# Patient Record
Sex: MALE | Race: WHITE | NOT HISPANIC OR LATINO | URBAN - METROPOLITAN AREA
[De-identification: names, ages, dates, MRNs, and addresses within clinical notes are randomized per-mention and may not be internally consistent; named-entity substitution may affect disease eponyms.]

---

## 2024-10-17 NOTE — PROGRESS NOTES
FAMILY MEDICINE  [facility]    Reason for visit:   Chief Complaint   Patient presents with    Establish Care    Chest Pain         SUBJECTIVE: Justin Olson is a 24 y.o. male  - with anxiety presents as a new patient to establish care and discuss chest  pain    Psychiatry Dr. Jignesh Gerard    1. Chest Pain    ]Justin presents with chest pain and left arm discomfort, seeking evaluation and reassurance regarding potential cardiac issues.    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 The pain has progressed to being constant throughout the day, initially noticed following alcohol consumption. The pain sometimes worsens throughout the day and after eating. He has been self-medicating with OTC omeprazole for the past 7 days to address these symptoms.Justin's father, a physician, suggested the symptoms might be related to anxiety or GERD rather than a cardiac issue, partly based on the absence of pain during the patient's daily running routine. Justin acknowledges increased stress due to a demanding academic semester.Justin reports a history of indigestion and nausea with the chest pain. He is currently taking Wellbutrin and Lexapro for anxiety and depression, managed by a psychiatrist in [place], Dr. Jignesh Gerard, with whom he consults approximately every 6 weeks. Dr. Gerard was considering prescribing propranolol for an upcoming speaking event but advised the patient to consult with a local physician first due to the cardiac-related symptoms.Justin denies worsening of chest pain during exercise.    He reports initially when pain started it was intermittent.  It was typically at rest.  He reports recently it has been constant throughout the day.  It feels like a pressure.  It seems to improve with exercise.  He denies any recent viral illnesses.  He was not had any recent vaccinations    Justin is on Omeprazole, an OTC medication,  daily for 7 days to treat indigestion and heartburn. He is also on Wellbutrin and Lexapro for anxiety and depression.  Justin has a history of anxiety and depression.    His father has high blood pressure, and there is a history of heart disease on the paternal side.    Justin reports recent alcohol consumption. He works as a law student at [employer].          Review of Systems   All other systems reviewed and are negative.      HEALTH MAINTENANCE:   Health Maintenance   Topic Date Due    Hepatitis C Screening  Never done    Lipid Panel  Never done    TETANUS VACCINE  04/12/2032    HPV Vaccines  Completed     Health Maintenance Topics with due status: Not Due       Topic Last Completion Date    TETANUS VACCINE 04/12/2022    RSV Vaccine (Age 60+ and Pregnant patients) Not Due     Health Maintenance Due   Topic Date Due    Hepatitis C Screening  Never done    Lipid Panel  Never done    HIV Screening  Never done    Influenza Vaccine (1) 09/01/2024    COVID-19 Vaccine (5 - 2024-25 season) 09/01/2024       HISTORY:   Past Medical History:   Diagnosis Date    Achilles tendinitis 04/13/2015    Acute bronchitis 03/20/2006    Qualifier: MDxof; Description: BRONCHITIS, ACUTE      Anxiety     Atopic conjunctivitis 01/10/2011    Description: ALLERGIC CONJUNCTIVITIS      Blepharitis 01/10/2011    Description: BLEPHARITIS NOS; Stop Reason: Resolved      Chondromalacia of left patella 09/08/2014    Description: CHONDROMALACIA PATELLAE, LEFT KNEE      Closed fracture of metatarsal bone 06/28/2010    Description: FRACTURE, METATARSUS      Closed fracture of phalanx of foot 11/18/2012    Description: FX CLOSED PHALANX, FOOT      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    Description: PES PLANUS, CONGENITAL      Depression     Eustachian tube dysfunction 01/17/2017    Description: EUSTACHIAN TUBE DYSFUNCTION, BILATERAL      Iritis 08/30/2012    Stop Reason: Resolved      Lumbar radiculopathy 10/03/2021    Stop Date: 10/03/2022       "Myopia 10/25/2012    Stop Reason: Resolved      Otitis media 11/13/2006    Verruca plantaris 07/01/2014    Description: PLANTAR WART      Vertigo 03/01/2016    Description: VERTIGO         Past Surgical History:   Procedure Laterality Date    HERNIA REPAIR Right 2016       Family History   Problem Relation Name Age of Onset    No Known Problems Mother      Hypertension Father      No Known Problems Brother      No Known Problems Brother      No Known Problems Brother      No Known Problems Brother      Osteopenia Maternal Grandmother      Heart disease Paternal Grandfather         Social History     Tobacco Use    Smoking status: Never    Smokeless tobacco: Former   Substance Use Topics    Alcohol use: Yes    Drug use: Not Currently       Social History     Social History Narrative    Single. Law Student at [employer] Plays several instruments. Active and exercises regularly. From [place]. Graduated college from [employer].        ALLERGIES:   Review of patient's allergies indicates:   Allergen Reactions    Cephalosporins Hives       MEDS:   Current Outpatient Medications on File Prior to Visit   Medication Sig Dispense Refill Last Dose/Taking    buPROPion (WELLBUTRIN XL) 150 MG TB24 tablet Take 1 tablet by mouth once daily.   Taking    EScitalopram oxalate (LEXAPRO) 10 MG tablet Take 1 tablet by mouth once daily.   Taking    omeprazole (PRILOSEC) 20 MG capsule Take 20 mg by mouth once daily.            Vital signs:   Vitals:    10/22/24 1007   BP: (!) 106/59   Patient Position: Sitting   Pulse: (!) 53   SpO2: 100%   Weight: 85.5 kg (188 lb 7.9 oz)   Height: 5' 11" (1.803 m)     Body mass index is 26.29 kg/m².    PHYSICAL EXAM:     Physical Exam  Vitals reviewed.   Constitutional:       General: He is not in acute distress.     Appearance: Normal appearance.   HENT:      Nose: Nose normal.      Mouth/Throat:      Pharynx: Uvula midline.   Eyes:      General: No scleral icterus.     Conjunctiva/sclera: " Conjunctivae normal.   Neck:      Thyroid: No thyromegaly.      Vascular: Normal carotid pulses. No carotid bruit or JVD.      Trachea: Trachea normal.   Cardiovascular:      Rate and Rhythm: Normal rate and regular rhythm.      Pulses: Normal pulses.      Heart sounds: Normal heart sounds. No murmur heard.     No friction rub. No gallop.   Pulmonary:      Effort: Pulmonary effort is normal.      Breath sounds: Normal breath sounds. No decreased breath sounds, wheezing, rhonchi or rales.   Abdominal:      General: Bowel sounds are normal.      Palpations: Abdomen is soft. There is no hepatomegaly.      Tenderness: There is no abdominal tenderness.   Musculoskeletal:      Cervical back: Neck supple.      Right lower leg: No edema.      Left lower leg: No edema.   Lymphadenopathy:      Cervical: No cervical adenopathy.   Skin:     General: Skin is warm.      Capillary Refill: Capillary refill takes less than 2 seconds.      Nails: There is no clubbing.   Neurological:      Mental Status: He is alert and oriented to person, place, and time.             PERTINENT RESULTS:   No results found for any previous visit.       ASSESSMENT/PLAN:    1. Chest pain, unspecified type  Overview:  - EKG today was abnormal showing concerns for possible pericarditis  -recommend chest x-ray   -recommend CBC, CMP, troponin, ESR and sed rate    Orders:  -     TSH; Future; Expected date: 10/22/2024  -     Lipid Panel; Future; Expected date: 10/22/2024  -     Comprehensive Metabolic Panel; Future; Expected date: 10/22/2024  -     CBC Auto Differential; Future; Expected date: 10/22/2024  -     EKG 12-lead; Future; Expected date: 10/22/2024  -     X-Ray Chest PA And Lateral; Future; Expected date: 10/22/2024  -     C-REACTIVE PROTEIN; Future; Expected date: 10/22/2024  -     Sedimentation rate; Future; Expected date: 10/22/2024  -     TROPONIN I; Future; Expected date: 10/22/2024    2. Screening for HIV (human immunodeficiency virus)  -      HIV 1/2 Ag/Ab (4th Gen); Future; Expected date: 10/22/2024    3. Need for hepatitis C screening test  -     Hepatitis C Antibody; Future; Expected date: 10/22/2024    4. Screening, iron deficiency anemia  -     CBC Auto Differential; Future; Expected date: 10/22/2024    5. Screening for metabolic disorder  -     Comprehensive Metabolic Panel; Future; Expected date: 10/22/2024    6. Encounter for lipid screening for cardiovascular disease  -     Lipid Panel; Future; Expected date: 10/22/2024    7. Screening for diabetes mellitus (DM)  -     Hemoglobin A1C; Future; Expected date: 10/22/2024    8. Screening for thyroid disorder  -     TSH; Future; Expected date: 10/22/2024          ORDERS:   Orders Placed This Encounter    X-Ray Chest PA And Lateral    TSH    Lipid Panel    Hemoglobin A1C    Comprehensive Metabolic Panel    CBC Auto Differential    HIV 1/2 Ag/Ab (4th Gen)    Hepatitis C Antibody    C-REACTIVE PROTEIN    Sedimentation rate    TROPONIN I    EKG 12-lead       Vaccines recommended:  Flu and COVID-19    Follow up if symptoms worsen or fail to improve. or sooner with any concerns        This note is dictated using the M*Modal Fluency Direct word recognition program. There are word recognition mistakes that are occasionally missed on review.    Dr. Soni Miller D.O.   [facility]

## 2024-10-22 ENCOUNTER — OFFICE VISIT (OUTPATIENT)
Dept: PRIMARY CARE CLINIC | Facility: CLINIC | Age: 24
End: 2024-10-22
Attending: FAMILY MEDICINE
Payer: COMMERCIAL

## 2024-10-22 ENCOUNTER — APPOINTMENT (OUTPATIENT)
Dept: RADIOLOGY | Facility: OTHER | Age: 24
End: 2024-10-22
Attending: FAMILY MEDICINE
Payer: COMMERCIAL

## 2024-10-22 VITALS
OXYGEN SATURATION: 100 % | HEIGHT: 71 IN | BODY MASS INDEX: 26.39 KG/M2 | WEIGHT: 188.5 LBS | DIASTOLIC BLOOD PRESSURE: 59 MMHG | SYSTOLIC BLOOD PRESSURE: 106 MMHG | HEART RATE: 53 BPM

## 2024-10-22 DIAGNOSIS — Z13.1 SCREENING FOR DIABETES MELLITUS (DM): ICD-10-CM

## 2024-10-22 DIAGNOSIS — Z13.29 SCREENING FOR THYROID DISORDER: ICD-10-CM

## 2024-10-22 DIAGNOSIS — R07.9 CHEST PAIN, UNSPECIFIED TYPE: ICD-10-CM

## 2024-10-22 DIAGNOSIS — Z11.59 NEED FOR HEPATITIS C SCREENING TEST: ICD-10-CM

## 2024-10-22 DIAGNOSIS — Z13.228 SCREENING FOR METABOLIC DISORDER: ICD-10-CM

## 2024-10-22 DIAGNOSIS — Z13.6 ENCOUNTER FOR LIPID SCREENING FOR CARDIOVASCULAR DISEASE: ICD-10-CM

## 2024-10-22 DIAGNOSIS — Z13.220 ENCOUNTER FOR LIPID SCREENING FOR CARDIOVASCULAR DISEASE: ICD-10-CM

## 2024-10-22 DIAGNOSIS — Z11.4 SCREENING FOR HIV (HUMAN IMMUNODEFICIENCY VIRUS): ICD-10-CM

## 2024-10-22 DIAGNOSIS — Z13.0 SCREENING, IRON DEFICIENCY ANEMIA: ICD-10-CM

## 2024-10-22 DIAGNOSIS — R07.9 CHEST PAIN, UNSPECIFIED TYPE: Primary | ICD-10-CM

## 2024-10-22 PROBLEM — M54.16 LUMBAR RADICULOPATHY: Status: RESOLVED | Noted: 2021-10-03 | Resolved: 2024-10-22

## 2024-10-22 PROBLEM — R59.9 REACTIVE LYMPHADENOPATHY: Status: ACTIVE | Noted: 2018-08-06

## 2024-10-22 PROBLEM — H69.90 EUSTACHIAN TUBE DYSFUNCTION: Status: RESOLVED | Noted: 2017-01-17 | Resolved: 2024-10-22

## 2024-10-22 PROBLEM — M54.16 LUMBAR RADICULOPATHY: Status: ACTIVE | Noted: 2021-10-03

## 2024-10-22 PROBLEM — R59.9 REACTIVE LYMPHADENOPATHY: Status: RESOLVED | Noted: 2018-08-06 | Resolved: 2024-10-22

## 2024-10-22 PROBLEM — H69.90 EUSTACHIAN TUBE DYSFUNCTION: Status: ACTIVE | Noted: 2017-01-17

## 2024-10-22 LAB
OHS QRS DURATION: 98 MS
OHS QTC CALCULATION: 381 MS

## 2024-10-22 PROCEDURE — 93010 ELECTROCARDIOGRAM REPORT: CPT | Mod: S$GLB,,, | Performed by: INTERNAL MEDICINE

## 2024-10-22 PROCEDURE — 99999 PR PBB SHADOW E&M-NEW PATIENT-LVL III: CPT | Mod: PBBFAC,,, | Performed by: FAMILY MEDICINE

## 2024-10-22 PROCEDURE — 99204 OFFICE O/P NEW MOD 45 MIN: CPT | Mod: S$GLB,,, | Performed by: FAMILY MEDICINE

## 2024-10-22 PROCEDURE — 71046 X-RAY EXAM CHEST 2 VIEWS: CPT | Mod: TC,PN

## 2024-10-22 PROCEDURE — 3074F SYST BP LT 130 MM HG: CPT | Mod: CPTII,S$GLB,, | Performed by: FAMILY MEDICINE

## 2024-10-22 PROCEDURE — 1160F RVW MEDS BY RX/DR IN RCRD: CPT | Mod: CPTII,S$GLB,, | Performed by: FAMILY MEDICINE

## 2024-10-22 PROCEDURE — 71046 X-RAY EXAM CHEST 2 VIEWS: CPT | Mod: 26,,, | Performed by: RADIOLOGY

## 2024-10-22 PROCEDURE — 1159F MED LIST DOCD IN RCRD: CPT | Mod: CPTII,S$GLB,, | Performed by: FAMILY MEDICINE

## 2024-10-22 PROCEDURE — 93005 ELECTROCARDIOGRAM TRACING: CPT | Mod: S$GLB,,, | Performed by: FAMILY MEDICINE

## 2024-10-22 PROCEDURE — 3008F BODY MASS INDEX DOCD: CPT | Mod: CPTII,S$GLB,, | Performed by: FAMILY MEDICINE

## 2024-10-22 PROCEDURE — 3078F DIAST BP <80 MM HG: CPT | Mod: CPTII,S$GLB,, | Performed by: FAMILY MEDICINE

## 2024-10-22 RX ORDER — ESCITALOPRAM OXALATE 10 MG/1
1 TABLET ORAL DAILY
COMMUNITY

## 2024-10-22 RX ORDER — OMEPRAZOLE 20 MG/1
20 CAPSULE, DELAYED RELEASE ORAL DAILY
COMMUNITY

## 2024-10-22 RX ORDER — BUPROPION HYDROCHLORIDE 150 MG/1
1 TABLET ORAL DAILY
COMMUNITY